# Patient Record
Sex: MALE | Race: WHITE | Employment: FULL TIME | URBAN - METROPOLITAN AREA
[De-identification: names, ages, dates, MRNs, and addresses within clinical notes are randomized per-mention and may not be internally consistent; named-entity substitution may affect disease eponyms.]

---

## 2018-10-30 ENCOUNTER — HOSPITAL ENCOUNTER (OUTPATIENT)
Dept: GENERAL RADIOLOGY | Age: 33
Discharge: HOME | End: 2018-10-30
Payer: COMMERCIAL

## 2018-10-30 DIAGNOSIS — M25.561 RIGHT KNEE PAIN: ICD-10-CM

## 2018-10-30 DIAGNOSIS — S69.90XA INJURY OF FINGER: ICD-10-CM

## 2018-10-30 PROCEDURE — 73562 X-RAY EXAM OF KNEE 3: CPT

## 2018-10-30 PROCEDURE — 73140 X-RAY EXAM OF FINGER(S): CPT

## 2022-08-05 ENCOUNTER — HOSPITAL ENCOUNTER (EMERGENCY)
Age: 37
Discharge: HOME | End: 2022-08-05
Attending: EMERGENCY MEDICINE
Payer: COMMERCIAL

## 2022-08-05 ENCOUNTER — APPOINTMENT (OUTPATIENT)
Dept: CT IMAGING | Age: 37
End: 2022-08-05
Attending: EMERGENCY MEDICINE
Payer: COMMERCIAL

## 2022-08-05 VITALS
SYSTOLIC BLOOD PRESSURE: 132 MMHG | BODY MASS INDEX: 26.58 KG/M2 | DIASTOLIC BLOOD PRESSURE: 82 MMHG | TEMPERATURE: 97.3 F | HEIGHT: 69 IN | HEART RATE: 80 BPM | RESPIRATION RATE: 18 BRPM | OXYGEN SATURATION: 98 %

## 2022-08-05 DIAGNOSIS — N20.0 KIDNEY STONE: Primary | ICD-10-CM

## 2022-08-05 LAB
ANION GAP SERPL CALC-SCNC: 8 MMOL/L (ref 3–18)
APPEARANCE UR: CLEAR
BACTERIA URNS QL MICRO: ABNORMAL /HPF
BASOPHILS # BLD: 0 K/UL (ref 0–0.1)
BASOPHILS NFR BLD: 0 % (ref 0–2)
BILIRUB UR QL: NEGATIVE
BUN SERPL-MCNC: 18 MG/DL (ref 7–18)
BUN/CREAT SERPL: 14 (ref 12–20)
CALCIUM SERPL-MCNC: 9.7 MG/DL (ref 8.5–10.1)
CAOX CRY URNS QL MICRO: ABNORMAL
CHLORIDE SERPL-SCNC: 104 MMOL/L (ref 100–111)
CO2 SERPL-SCNC: 29 MMOL/L (ref 21–32)
COLOR UR: ABNORMAL
CREAT SERPL-MCNC: 1.33 MG/DL (ref 0.6–1.3)
DIFFERENTIAL METHOD BLD: ABNORMAL
EOSINOPHIL # BLD: 0.1 K/UL (ref 0–0.4)
EOSINOPHIL NFR BLD: 1 % (ref 0–5)
EPITH CASTS URNS QL MICRO: ABNORMAL /LPF (ref 0–5)
ERYTHROCYTE [DISTWIDTH] IN BLOOD BY AUTOMATED COUNT: 14.1 % (ref 11.6–14.5)
GLUCOSE SERPL-MCNC: 119 MG/DL (ref 74–99)
GLUCOSE UR STRIP.AUTO-MCNC: NEGATIVE MG/DL
HCT VFR BLD AUTO: 51.7 % (ref 36–48)
HGB BLD-MCNC: 17.3 G/DL (ref 13–16)
HGB UR QL STRIP: ABNORMAL
IMM GRANULOCYTES # BLD AUTO: 0 K/UL (ref 0–0.04)
IMM GRANULOCYTES NFR BLD AUTO: 0 % (ref 0–0.5)
KETONES UR QL STRIP.AUTO: 80 MG/DL
LEUKOCYTE ESTERASE UR QL STRIP.AUTO: NEGATIVE
LYMPHOCYTES # BLD: 4.8 K/UL (ref 0.9–3.6)
LYMPHOCYTES NFR BLD: 38 % (ref 21–52)
MCH RBC QN AUTO: 29.7 PG (ref 24–34)
MCHC RBC AUTO-ENTMCNC: 33.5 G/DL (ref 31–37)
MCV RBC AUTO: 88.8 FL (ref 78–100)
MONOCYTES # BLD: 1.2 K/UL (ref 0.05–1.2)
MONOCYTES NFR BLD: 10 % (ref 3–10)
MUCOUS THREADS URNS QL MICRO: ABNORMAL /LPF
NEUTS SEG # BLD: 6.4 K/UL (ref 1.8–8)
NEUTS SEG NFR BLD: 51 % (ref 40–73)
NITRITE UR QL STRIP.AUTO: NEGATIVE
NRBC # BLD: 0 K/UL (ref 0–0.01)
NRBC BLD-RTO: 0 PER 100 WBC
PH UR STRIP: 5.5 [PH] (ref 5–8)
PLATELET # BLD AUTO: 386 K/UL (ref 135–420)
PMV BLD AUTO: 8.9 FL (ref 9.2–11.8)
POTASSIUM SERPL-SCNC: 3.8 MMOL/L (ref 3.5–5.5)
PROT UR STRIP-MCNC: ABNORMAL MG/DL
RBC # BLD AUTO: 5.82 M/UL (ref 4.35–5.65)
RBC #/AREA URNS HPF: ABNORMAL /HPF (ref 0–5)
SODIUM SERPL-SCNC: 141 MMOL/L (ref 136–145)
SP GR UR REFRACTOMETRY: >1.03 (ref 1–1.03)
UROBILINOGEN UR QL STRIP.AUTO: 1 EU/DL (ref 0.2–1)
WBC # BLD AUTO: 12.5 K/UL (ref 4.6–13.2)
WBC URNS QL MICRO: ABNORMAL /HPF (ref 0–4)

## 2022-08-05 PROCEDURE — 74011250636 HC RX REV CODE- 250/636: Performed by: STUDENT IN AN ORGANIZED HEALTH CARE EDUCATION/TRAINING PROGRAM

## 2022-08-05 PROCEDURE — 80048 BASIC METABOLIC PNL TOTAL CA: CPT

## 2022-08-05 PROCEDURE — 96374 THER/PROPH/DIAG INJ IV PUSH: CPT

## 2022-08-05 PROCEDURE — 85025 COMPLETE CBC W/AUTO DIFF WBC: CPT

## 2022-08-05 PROCEDURE — 74011250636 HC RX REV CODE- 250/636: Performed by: EMERGENCY MEDICINE

## 2022-08-05 PROCEDURE — 96376 TX/PRO/DX INJ SAME DRUG ADON: CPT

## 2022-08-05 PROCEDURE — 81001 URINALYSIS AUTO W/SCOPE: CPT

## 2022-08-05 PROCEDURE — 99284 EMERGENCY DEPT VISIT MOD MDM: CPT

## 2022-08-05 PROCEDURE — 96375 TX/PRO/DX INJ NEW DRUG ADDON: CPT

## 2022-08-05 PROCEDURE — 74176 CT ABD & PELVIS W/O CONTRAST: CPT

## 2022-08-05 RX ORDER — MORPHINE SULFATE 4 MG/ML
4 INJECTION INTRAVENOUS
Status: COMPLETED | OUTPATIENT
Start: 2022-08-05 | End: 2022-08-05

## 2022-08-05 RX ORDER — TAMSULOSIN HYDROCHLORIDE 0.4 MG/1
0.4 CAPSULE ORAL DAILY
Qty: 15 CAPSULE | Refills: 0 | Status: SHIPPED | OUTPATIENT
Start: 2022-08-05 | End: 2022-08-20

## 2022-08-05 RX ORDER — EMTRICITABINE AND TENOFOVIR DISOPROXIL FUMARATE 200; 300 MG/1; MG/1
1 TABLET, FILM COATED ORAL DAILY
COMMUNITY
Start: 2022-05-24

## 2022-08-05 RX ORDER — ONDANSETRON 2 MG/ML
4 INJECTION INTRAMUSCULAR; INTRAVENOUS
Status: COMPLETED | OUTPATIENT
Start: 2022-08-05 | End: 2022-08-05

## 2022-08-05 RX ORDER — OXYCODONE AND ACETAMINOPHEN 5; 325 MG/1; MG/1
1 TABLET ORAL
Qty: 9 TABLET | Refills: 0 | Status: SHIPPED | OUTPATIENT
Start: 2022-08-05 | End: 2022-08-08

## 2022-08-05 RX ORDER — KETOROLAC TROMETHAMINE 15 MG/ML
15 INJECTION, SOLUTION INTRAMUSCULAR; INTRAVENOUS ONCE
Status: COMPLETED | OUTPATIENT
Start: 2022-08-05 | End: 2022-08-05

## 2022-08-05 RX ADMIN — KETOROLAC TROMETHAMINE 15 MG: 15 INJECTION, SOLUTION INTRAMUSCULAR; INTRAVENOUS at 02:28

## 2022-08-05 RX ADMIN — MORPHINE SULFATE 4 MG: 4 INJECTION, SOLUTION INTRAMUSCULAR; INTRAVENOUS at 08:41

## 2022-08-05 RX ADMIN — ONDANSETRON 4 MG: 2 INJECTION INTRAMUSCULAR; INTRAVENOUS at 05:39

## 2022-08-05 RX ADMIN — MORPHINE SULFATE 4 MG: 4 INJECTION, SOLUTION INTRAMUSCULAR; INTRAVENOUS at 05:40

## 2022-08-05 NOTE — ED NOTES
Patient signed out to me as having a kidney stone but was pending UA at the time of signout to make sure patient did not have an infected stone. UA has now returned without any sign of urinary tract infection. We will plan to discharge patient with p.o. Percocets to help with pain while passing stone and Flomax. Also give patient a number to call urology for follow-up.

## 2022-08-05 NOTE — ED NOTES
Assumed care of pt in rm 13. Pt here for R flank pain. Hx of Kidney stones. UA collected, pt requesting more pain medication.

## 2022-08-05 NOTE — Clinical Note
[address] [place] [address] [address] [facility] EMERGENCY DEPT  [id number] [address] [phone]    Work/School Note    Date: 8/5/2022    To Whom It May concern:      Jina Ocasio was seen and treated today in the emergency room by the following provider(s):  Attending Provider: Shon Sanchez MD.      Jina Ocasio is excused from work/school on 08/05/22. He is clear to return to work/school on 08/06/22.         Sincerely,          Helga Nava MD

## 2022-08-05 NOTE — Clinical Note
[facility] EMERGENCY DEPT  [id number] [id number] [phone]    Work/School Note    Date: 8/5/2022    To Whom It May concern:      Cal Rivera was seen and treated today in the emergency room by the following provider(s):  Attending Provider: Rama Caro MD.      Cal Rivera is excused from work/school on 08/05/22. He is clear to return to work/school on 08/06/22.         Sincerely,          Patricia Alan MD

## 2022-08-05 NOTE — DISCHARGE INSTRUCTIONS
Please take medications as prescribed. Please return to the ER with any new or worsening symptoms or any other concerns. Please return to the Emergency Department if you develop a fever, chills, cannot eat or drink due to nausea or vomiting, or if any of your symptoms worsen. Also, It is extremely important that you follow-up with a primary care physician and if you do not have one currently use the contact information provided to obtain an appointment. If none was provided please call the number on the back of your insurance card to locate a Primary care doctor. Many offices have \"cancellation lists\" that you can ask to be placed on; should a patient with an earlier appointment cancel you will be notified to take their place. Please return to the Emergency Room immediately if your symptoms worsen. Please carefully read all discharge instructions    [email or web address]    What are GoodRx coupons? GoodRx coupons will help you pay less than the cash price for your prescription. [employer] free to use and are accepted at virtually every [place] pharmacy.   Your pharmacist will know how to enter the codes on the coupon to pull up the lowest discount available

## 2022-08-05 NOTE — ED PROVIDER NOTES
EMERGENCY DEPARTMENT HISTORY AND PHYSICAL EXAM    1:58 AM patient examined in the lobby, registration called me to the lobby to examine the patient. Date: 8/5/2022  Patient Name: Abdi Saavedra    History of Presenting Illness     Chief Complaint   Patient presents with    Flank Pain         History Provided By: patient    Additional History (Context): Abdi Saavedra is a 44 y.o. male presents with history of kidney stones about 10 minutes prior to arrival was woken up by 10 out of 10 right flank pain radiating into his groin. No urinary abnormalities very nauseous diaphoretic clammy. No vomiting. Uriel Lopez PCP: Tiera Marte MD    Chief Complaint:   Duration:    Timing:    Location:   Quality:   Severity:   Modifying Factors:   Associated Symptoms:       Current Outpatient Medications   Medication Sig Dispense Refill    emtricitabine-tenofovir, TDF, (TRUVADA) 200-300 mg per tablet Take 1 Tablet by mouth in the morning. oxyCODONE-acetaminophen (PERCOCET) 5-325 mg per tablet Take 1 Tab by mouth every four (4) hours as needed (Breakthrough pain only). Max Daily Amount: 6 Tabs. 20 Tab 0    cyclobenzaprine (FLEXERIL) 10 mg tablet Take 1 Tab by mouth three (3) times daily as needed for Muscle Spasm(s). 20 Tab 0    lisdexamfetamine (VYVANSE) 20 mg capsule Take 20 mg by mouth daily. Indications: ATTENTION-DEFICIT HYPERACTIVITY DISORDER, took one in past week, has not been taking as prescribed         Past History     Past Medical History:  Past Medical History:   Diagnosis Date    ADD (attention deficit disorder)     Depressive disorder, not elsewhere classified 9/7/2015       Past Surgical History:  Past Surgical History:   Procedure Laterality Date    HX ORTHOPAEDIC      metal vipin right leg       Family History:  History reviewed. No pertinent family history. Social History:  Social History     Tobacco Use    Smoking status: Never    Smokeless tobacco: Never   Substance Use Topics    Alcohol use:  Yes Alcohol/week: 1.0 standard drink     Types: 1 Cans of beer per week    Drug use: Yes     Types: Marijuana       Allergies:  No Known Allergies      Review of Systems     Review of Systems   Constitutional:  Negative for diaphoresis and fever. HENT:  Negative for congestion and sore throat. Eyes:  Negative for pain and itching. Respiratory:  Negative for cough and shortness of breath. Cardiovascular:  Negative for chest pain and palpitations. Gastrointestinal:  Negative for abdominal pain and diarrhea. Endocrine: Negative for polydipsia and polyuria. Genitourinary:  Negative for dysuria and hematuria. Musculoskeletal:  Positive for back pain. Negative for arthralgias and myalgias. Skin:  Negative for rash and wound. Neurological:  Negative for seizures and syncope. Hematological:  Does not bruise/bleed easily. Psychiatric/Behavioral:  Negative for agitation and hallucinations. Physical Exam       Patient Vitals for the past 12 hrs:   Temp Pulse BP SpO2   08/05/22 0232 97.3 °F (36.3 °C) (!) 50 -- --   08/05/22 0156 97.4 °F (36.3 °C) -- (!) 137/94 98 %       IPVITALS  Patient Vitals for the past 24 hrs:   BP Temp Pulse SpO2 Height   08/05/22 0232 -- 97.3 °F (36.3 °C) (!) 50 -- --   08/05/22 0156 (!) 137/94 97.4 °F (36.3 °C) -- 98 % 5' 9\" (1.753 m)       Physical Exam  Vitals and nursing note reviewed. Constitutional:       General: He is in acute distress. Appearance: He is well-developed. He is diaphoretic. HENT:      Head: Normocephalic and atraumatic. Eyes:      General: No scleral icterus. Conjunctiva/sclera: Conjunctivae normal.   Neck:      Vascular: No JVD. Cardiovascular:      Rate and Rhythm: Normal rate and regular rhythm. Heart sounds: Normal heart sounds. Comments: 4 intact extremity pulses  Pulmonary:      Effort: Pulmonary effort is normal.      Breath sounds: Normal breath sounds. Abdominal:      Palpations: Abdomen is soft. There is no mass. Tenderness: There is no abdominal tenderness. There is right CVA tenderness. There is no left CVA tenderness. Musculoskeletal:         General: Normal range of motion. Cervical back: Normal range of motion and neck supple. Lymphadenopathy:      Cervical: No cervical adenopathy. Skin:     General: Skin is warm. Neurological:      Mental Status: He is alert. Diagnostic Study Results   Labs -  Recent Results (from the past 24 hour(s))   CBC WITH AUTOMATED DIFF    Collection Time: 08/05/22  2:07 AM   Result Value Ref Range    WBC 12.5 4.6 - 13.2 K/uL    RBC 5.82 (H) 4.35 - 5.65 M/uL    HGB 17.3 (H) 13.0 - 16.0 g/dL    HCT 51.7 (H) 36.0 - 48.0 %    MCV 88.8 78.0 - 100.0 FL    MCH 29.7 24.0 - 34.0 PG    MCHC 33.5 31.0 - 37.0 g/dL    RDW 14.1 11.6 - 14.5 %    PLATELET 321 243 - 201 K/uL    MPV 8.9 (L) 9.2 - 11.8 FL    NRBC 0.0 0  WBC    ABSOLUTE NRBC 0.00 0.00 - 0.01 K/uL    NEUTROPHILS 51 40 - 73 %    LYMPHOCYTES 38 21 - 52 %    MONOCYTES 10 3 - 10 %    EOSINOPHILS 1 0 - 5 %    BASOPHILS 0 0 - 2 %    IMMATURE GRANULOCYTES 0 0.0 - 0.5 %    ABS. NEUTROPHILS 6.4 1.8 - 8.0 K/UL    ABS. LYMPHOCYTES 4.8 (H) 0.9 - 3.6 K/UL    ABS. MONOCYTES 1.2 0.05 - 1.2 K/UL    ABS. EOSINOPHILS 0.1 0.0 - 0.4 K/UL    ABS. BASOPHILS 0.0 0.0 - 0.1 K/UL    ABS. IMM. GRANS. 0.0 0.00 - 0.04 K/UL    DF AUTOMATED     METABOLIC PANEL, BASIC    Collection Time: 08/05/22  2:07 AM   Result Value Ref Range    Sodium 141 136 - 145 mmol/L    Potassium 3.8 3.5 - 5.5 mmol/L    Chloride 104 100 - 111 mmol/L    CO2 29 21 - 32 mmol/L    Anion gap 8 3.0 - 18 mmol/L    Glucose 119 (H) 74 - 99 mg/dL    BUN 18 7.0 - 18 MG/DL    Creatinine 1.33 (H) 0.6 - 1.3 MG/DL    BUN/Creatinine ratio 14 12 - 20      GFR est AA >60 >60 ml/min/1.73m2    GFR est non-AA >60 >60 ml/min/1.73m2    Calcium 9.7 8.5 - 10.1 MG/DL       Radiologic Studies -   CT ABD PELV WO CONT   Final Result   Small right calcified renal stone at the UPJ up to 3 mm. CT ABD PELV WO CONT    Result Date: 8/5/2022  EXAM: CT ABD PELV WO CONT CLINICAL INDICATION/HISTORY: Right flank pain radiates to groin suspect kidney stone TECHNIQUE: Contiguous axial images were obtained through the abdomen and pelvis. From these, sagittal and coronal reconstructions were generated. Contrast : None  CT scans at this facility are performed using dose optimization technique as appropriate with performed exam, to include automated exposure control, adjustment of mA and/or kV according to patient's size (including appropriate matching for site-specific examinations), or use of iterative reconstruction technique. COMPARISON: None FINDINGS: Lower chest: Bilateral lung bases are clear. The base of the heart is within limits. Liver: No focal lesions identified. Gallbladder/Biliary: No calcified gallstones identified. Spleen: Normal in size and contour. Pancreas: Within normal limits for technique. Kidneys, Urinary Bladder:  Symmetric and nonhydronephrotic. Mild prominence of the left renal collecting system may represent parapelvic cyst. Small calcified stone at the right UPJ measuring up to 3 mm. Urinary bladder is within normal limits. Adrenal Glands: Unremarkable Bowels/Mesentery: No obstruction or mesenteric inflammation. Normal appendix. Peritoneum/Abdominal Wall: Unremarkable Pelvic organs: Unremarkable Vascular: Aorta unremarkable for age. IVC unremarkable. Lymph Nodes: No adenopathy. Bones: No acute findings. Unremarkable for age. Small right calcified renal stone at the UPJ up to 3 mm. Medications ordered:   Medications   ketorolac (TORADOL) injection 15 mg (15 mg IntraVENous Given 8/5/22 0228)   morphine injection 4 mg (4 mg IntraVENous Given 8/5/22 8240)   ondansetron (ZOFRAN) injection 4 mg (4 mg IntraVENous Given 8/5/22 5590)         Medical Decision Making   Initial Medical Decision Making and DDx:  Strongly suggestive of kidney stone.   Otherwise hernia appendicitis felt to be less likely. ED Course: Progress Notes, Reevaluation, and Consults:     6:31 AM small kidney stone found, no elevated white count fever or kidney failure. 6:31 AM : Pt care transferred to oncoming ED provider. History of patient complaint(s), available diagnostic reports and current treatment plan has been discussed thoroughly. Dr. Gurpreet James, pending urinalysis and anticipate uneventful discharge    Oncoming 's assistance in completion of this plan is greatly appreciated but it should be noted that I will be the provider of record for this patient. I am the first provider for this patient. I reviewed the vital signs, available nursing notes, past medical history, past surgical history, family history and social history. Patient Vitals for the past 12 hrs:   Temp Pulse BP SpO2   08/05/22 0232 97.3 °F (36.3 °C) (!) 50 -- --   08/05/22 0156 97.4 °F (36.3 °C) -- (!) 137/94 98 %       Vital Signs-Reviewed the patient's vital signs. Pulse Oximetry Analysis, Cardiac Monitor, 12 lead ekg:      Interpreted by the EP. Records Reviewed: Nursing notes reviewed (Time of Review: 1:58 AM)    Procedures:   Critical Care Time:   Aspirin: (was aspirin given for stroke?)    Diagnosis     Clinical Impression: No diagnosis found. Disposition:       Follow-up Information    None          Patient's Medications   Start Taking    No medications on file   Continue Taking    CYCLOBENZAPRINE (FLEXERIL) 10 MG TABLET    Take 1 Tab by mouth three (3) times daily as needed for Muscle Spasm(s). EMTRICITABINE-TENOFOVIR, TDF, (TRUVADA) 200-300 MG PER TABLET    Take 1 Tablet by mouth in the morning. LISDEXAMFETAMINE (VYVANSE) 20 MG CAPSULE    Take 20 mg by mouth daily.  Indications: ATTENTION-DEFICIT HYPERACTIVITY DISORDER, took one in past week, has not been taking as prescribed    OXYCODONE-ACETAMINOPHEN (PERCOCET) 5-325 MG PER TABLET    Take 1 Tab by mouth every four (4) hours as needed (Breakthrough pain only). Max Daily Amount: 6 Tabs.    These Medications have changed    No medications on file   Stop Taking    No medications on file     _______________________________    Notes:    Teodora Perez MD using Dragon dictation      _______________________________